# Patient Record
Sex: FEMALE | NOT HISPANIC OR LATINO | URBAN - METROPOLITAN AREA
[De-identification: names, ages, dates, MRNs, and addresses within clinical notes are randomized per-mention and may not be internally consistent; named-entity substitution may affect disease eponyms.]

---

## 2021-04-26 ENCOUNTER — IMPORTED ENCOUNTER (OUTPATIENT)
Dept: URBAN - METROPOLITAN AREA CLINIC 1 | Facility: CLINIC | Age: 71
End: 2021-04-26

## 2021-04-26 PROBLEM — H26.493: Noted: 2021-04-26

## 2021-04-26 PROBLEM — H40.013: Noted: 2021-04-26

## 2021-04-26 PROBLEM — H16.143: Noted: 2021-04-26

## 2021-04-26 PROBLEM — H04.123: Noted: 2021-04-26

## 2021-04-26 PROBLEM — Z96.1: Noted: 2021-04-26

## 2021-04-26 PROCEDURE — 99204 OFFICE O/P NEW MOD 45 MIN: CPT

## 2021-04-26 PROCEDURE — 92250 FUNDUS PHOTOGRAPHY W/I&R: CPT

## 2021-04-26 NOTE — PATIENT DISCUSSION
1.  SHENA w/ PEK OU - Recommend ATs BID-TID OU routinely. 2.  PCO OU: (Posterior Capsule Opacification)   Observe and consider yag cap when pt feels pco visually significant and visual acuity decreases to appropriate level. 3. Pseudophakia OU - 2018 done elsewhere. 4. Glaucoma Suspect OU (CD 0.75/0.80) : Disc photos today show cupping OU. By Hx past w/u negative (per pt). (-) Family Hx. Patient is considered Low Risk. Condition was discussed with patient and patient understands. Will continue to monitor patient for any progression in condition. Patient was advised to call us with any problems questions or concerns. Patient deferred Manifest Rx today. Return for an appointment in 6 months 10/OCT/HVF with Dr. Lorraine Jacob.

## 2021-10-26 ENCOUNTER — IMPORTED ENCOUNTER (OUTPATIENT)
Dept: URBAN - METROPOLITAN AREA CLINIC 1 | Facility: CLINIC | Age: 71
End: 2021-10-26

## 2021-10-26 PROBLEM — H40.013: Noted: 2021-10-26

## 2021-10-26 PROCEDURE — 92133 CPTRZD OPH DX IMG PST SGM ON: CPT

## 2021-10-26 PROCEDURE — 92012 INTRM OPH EXAM EST PATIENT: CPT

## 2021-10-26 PROCEDURE — 92083 EXTENDED VISUAL FIELD XM: CPT

## 2021-10-26 NOTE — PATIENT DISCUSSION
1.  Glaucoma Suspect OU  (CD 0.75/0.80) -- OCT WNL OU HVF essentially normal. IOP borderline at 20/21 today. (-) Family Hx. Patient is considered Low Risk. Condition was discussed with patient and patient understands. Will continue to monitor patient for any progression in condition. Patient was advised to call us with any problems questions or concerns. 2.  SHENA w/ PEK OU -- Recommend ATs BID-TID OU routinely. 3.  PCO OU: (Posterior Capsule Opacification) -- Observe and consider yag cap when pt feels pco visually significant and visual acuity decreases to appropriate level. 4. Pseudophakia OU -- 2018 done elsewhere. 5. Fuchs' Dystrophy OU -- Observe. Return for an appointment in 1 year 27 with Dr. Tanya Vergara.

## 2022-04-02 ASSESSMENT — TONOMETRY
OS_IOP_MMHG: 21
OD_IOP_MMHG: 16
OS_IOP_MMHG: 16
OD_IOP_MMHG: 20

## 2022-04-02 ASSESSMENT — VISUAL ACUITY
OD_GLARE: 20/50
OD_SC: 20/20
OD_CC: J1+
OS_SC: 20/20
OS_GLARE: 20/50
OD_CC: 20/30
OS_CC: J1+
OS_CC: 20/30